# Patient Record
Sex: FEMALE | Race: BLACK OR AFRICAN AMERICAN | Employment: FULL TIME | URBAN - METROPOLITAN AREA
[De-identification: names, ages, dates, MRNs, and addresses within clinical notes are randomized per-mention and may not be internally consistent; named-entity substitution may affect disease eponyms.]

---

## 2017-10-29 ENCOUNTER — HOSPITAL ENCOUNTER (OUTPATIENT)
Dept: ULTRASOUND IMAGING | Age: 47
Discharge: HOME | End: 2017-10-29
Attending: FAMILY MEDICINE
Payer: COMMERCIAL

## 2017-10-29 DIAGNOSIS — N94.6 PAINFUL MENSTRUATION: ICD-10-CM

## 2017-10-29 DIAGNOSIS — N94.6 DYSMENORRHEA: ICD-10-CM

## 2017-10-29 DIAGNOSIS — N92.0 UNUSUALLY FREQUENT MENSES: ICD-10-CM

## 2017-10-29 DIAGNOSIS — N92.0 EXCESSIVE OR FREQUENT MENSTRUATION: ICD-10-CM

## 2017-10-29 PROCEDURE — 76856 US EXAM PELVIC COMPLETE: CPT

## 2017-10-29 PROCEDURE — 76830 TRANSVAGINAL US NON-OB: CPT

## 2018-02-02 ENCOUNTER — HOSPITAL ENCOUNTER (OUTPATIENT)
Dept: PREADMISSION TESTING | Age: 48
Discharge: HOME | End: 2018-02-02
Attending: OBSTETRICS & GYNECOLOGY
Payer: COMMERCIAL

## 2018-02-02 VITALS
HEIGHT: 70 IN | RESPIRATION RATE: 20 BRPM | OXYGEN SATURATION: 100 % | HEART RATE: 84 BPM | BODY MASS INDEX: 40.81 KG/M2 | SYSTOLIC BLOOD PRESSURE: 140 MMHG | WEIGHT: 285.06 LBS | DIASTOLIC BLOOD PRESSURE: 75 MMHG | TEMPERATURE: 98 F

## 2018-02-02 LAB
ABO + RH BLD: NORMAL
ANION GAP SERPL CALC-SCNC: 8 MMOL/L (ref 5–15)
APPEARANCE UR: CLEAR
ATRIAL RATE: 76 BPM
BACTERIA URNS QL MICRO: ABNORMAL /HPF
BILIRUB UR QL: NEGATIVE
BLOOD GROUP ANTIBODIES SERPL: NORMAL
BUN SERPL-MCNC: 10 MG/DL (ref 6–20)
BUN/CREAT SERPL: 14 (ref 12–20)
CALCIUM SERPL-MCNC: 9 MG/DL (ref 8.5–10.1)
CALCULATED P AXIS, ECG09: 48 DEGREES
CALCULATED R AXIS, ECG10: 12 DEGREES
CALCULATED T AXIS, ECG11: 48 DEGREES
CHLORIDE SERPL-SCNC: 106 MMOL/L (ref 97–108)
CO2 SERPL-SCNC: 27 MMOL/L (ref 21–32)
COLOR UR: ABNORMAL
CREAT SERPL-MCNC: 0.73 MG/DL (ref 0.55–1.02)
DIAGNOSIS, 93000: NORMAL
EPITH CASTS URNS QL MICRO: ABNORMAL /LPF
ERYTHROCYTE [DISTWIDTH] IN BLOOD BY AUTOMATED COUNT: 15.5 % (ref 11.5–14.5)
GLUCOSE SERPL-MCNC: 85 MG/DL (ref 65–100)
GLUCOSE UR STRIP.AUTO-MCNC: NEGATIVE MG/DL
HCT VFR BLD AUTO: 31.2 % (ref 35–47)
HGB BLD-MCNC: 9.3 G/DL (ref 11.5–16)
HGB UR QL STRIP: NEGATIVE
HYALINE CASTS URNS QL MICRO: ABNORMAL /LPF (ref 0–5)
KETONES UR QL STRIP.AUTO: NEGATIVE MG/DL
LEUKOCYTE ESTERASE UR QL STRIP.AUTO: NEGATIVE
MCH RBC QN AUTO: 21.1 PG (ref 26–34)
MCHC RBC AUTO-ENTMCNC: 29.8 G/DL (ref 30–36.5)
MCV RBC AUTO: 70.7 FL (ref 80–99)
NITRITE UR QL STRIP.AUTO: NEGATIVE
NRBC # BLD: 0 K/UL (ref 0–0.01)
NRBC BLD-RTO: 0 PER 100 WBC
P-R INTERVAL, ECG05: 146 MS
PH UR STRIP: 5.5 [PH] (ref 5–8)
PLATELET # BLD AUTO: 439 K/UL (ref 150–400)
PMV BLD AUTO: 9.7 FL (ref 8.9–12.9)
POTASSIUM SERPL-SCNC: 4 MMOL/L (ref 3.5–5.1)
PROT UR STRIP-MCNC: NEGATIVE MG/DL
Q-T INTERVAL, ECG07: 398 MS
QRS DURATION, ECG06: 84 MS
QTC CALCULATION (BEZET), ECG08: 447 MS
RBC # BLD AUTO: 4.41 M/UL (ref 3.8–5.2)
RBC #/AREA URNS HPF: ABNORMAL /HPF (ref 0–5)
SODIUM SERPL-SCNC: 141 MMOL/L (ref 136–145)
SP GR UR REFRACTOMETRY: 1.01 (ref 1–1.03)
SPECIMEN EXP DATE BLD: NORMAL
UA: UC IF INDICATED,UAUC: ABNORMAL
UROBILINOGEN UR QL STRIP.AUTO: 0.2 EU/DL (ref 0.2–1)
VENTRICULAR RATE, ECG03: 76 BPM
WBC # BLD AUTO: 8.9 K/UL (ref 3.6–11)
WBC URNS QL MICRO: ABNORMAL /HPF (ref 0–4)

## 2018-02-02 PROCEDURE — 85027 COMPLETE CBC AUTOMATED: CPT | Performed by: OBSTETRICS & GYNECOLOGY

## 2018-02-02 PROCEDURE — 81001 URINALYSIS AUTO W/SCOPE: CPT | Performed by: OBSTETRICS & GYNECOLOGY

## 2018-02-02 PROCEDURE — 86900 BLOOD TYPING SEROLOGIC ABO: CPT | Performed by: OBSTETRICS & GYNECOLOGY

## 2018-02-02 PROCEDURE — 36415 COLL VENOUS BLD VENIPUNCTURE: CPT | Performed by: OBSTETRICS & GYNECOLOGY

## 2018-02-02 PROCEDURE — 80048 BASIC METABOLIC PNL TOTAL CA: CPT | Performed by: OBSTETRICS & GYNECOLOGY

## 2018-02-02 PROCEDURE — 93005 ELECTROCARDIOGRAM TRACING: CPT

## 2018-02-02 PROCEDURE — 87086 URINE CULTURE/COLONY COUNT: CPT | Performed by: OBSTETRICS & GYNECOLOGY

## 2018-02-02 RX ORDER — LEVOTHYROXINE SODIUM 150 UG/1
150 TABLET ORAL
COMMUNITY

## 2018-02-02 RX ORDER — CEFAZOLIN SODIUM/WATER 2 G/20 ML
2 SYRINGE (ML) INTRAVENOUS ONCE
Status: CANCELLED | OUTPATIENT
Start: 2018-02-08 | End: 2018-02-08

## 2018-02-02 RX ORDER — BISMUTH SUBSALICYLATE 262 MG
1 TABLET,CHEWABLE ORAL DAILY
COMMUNITY

## 2018-02-02 RX ORDER — LANOLIN ALCOHOL/MO/W.PET/CERES
CREAM (GRAM) TOPICAL
COMMUNITY

## 2018-02-02 NOTE — PERIOP NOTES
Left message with Dr. Evans Grams office concerning pre-op antibiotic dose/weight--office to follow up.

## 2018-02-02 NOTE — PERIOP NOTES
Incentive Spirometer        Using the incentive spirometer helps expand the small air sacs of your lungs, helps you breathe deeply, and helps improve your lung function. Use your incentive spirometer twice a day (10 breaths each time) prior to surgery. How to Use Your Incentive Spirometer:  1. Hold the incentive spirometer in an upright position. 2. Breathe out as usual.   3. Place the mouthpiece in your mouth and seal your lips tightly around it. 4. Take a deep breath. Breathe in slowly and as deeply as possible. Keep the blue flow rate guide between the arrows. 5. Hold your breath as long as possible. Then exhale slowly and allow the piston to fall to the bottom of the column. 6. Rest for a few seconds and repeat steps one through five at least 10 times. PAT Tidal Volume_________2200_________  x_____4___________  Date__02/02/18_____________________    Cynthia Mehtat THE INCENTIVE SPIROMETER WITH YOU TO THE HOSPITAL ON THE DAY OF YOUR SURGERY. Opportunity given to ask and answer questions as well as to observe return demonstration.     Patient signature_____________________________    Witness____________________________

## 2018-02-02 NOTE — PERIOP NOTES
[facility]  Preoperative Instructions        Surgery Date 02/08/18         Time of Arrival 1000   Contact # [phone]    1. On the day of your surgery, please report to the Surgical Services Registration Desk and sign in at your designated time. The Surgery Center is located to the right of the Emergency Room. 2. You must have someone with you to drive you home. You should not drive a car for 24 hours following surgery. Please make arrangements for a friend or family member to stay with you for the first 24 hours after your surgery. 3. Do not have anything to eat or drink (including water, gum, mints, coffee, juice) after midnight 02/07/18   . ? This may not apply to medications prescribed by your physician. ?(Please note below the special instructions with medications to take the morning of your procedure.)    4. We recommend you do not drink any alcoholic beverages for 24 hours before and after your surgery. 5. Contact your surgeons office for instructions on the following medications: non-steroidal anti-inflammatory drugs (i.e. Advil, Aleve), vitamins, and supplements. (Some surgeons will want you to stop these medications prior to surgery and others may allow you to take them)  **If you are currently taking Plavix, Coumadin, Aspirin and/or other blood-thinning agents, contact your surgeon for instructions. ** Your surgeon will partner with the physician prescribing these medications to determine if it is safe to stop or if you need to continue taking. Please do not stop taking these medications without instructions from your surgeon    6. Wear comfortable clothes. Wear glasses instead of contacts. Do not bring any money or jewelry. Please bring picture ID, insurance card, and any prearranged co-payment or hospital payment. Do not wear make-up, particularly mascara the morning of your surgery. Do not wear nail polish, particularly if you are having foot /hand surgery. Wear your hair loose or down, no ponytails, buns, vasu pins or clips. All body piercings must be removed. Please shower with antibacterial soap for three consecutive days before and on the morning of surgery, but do not apply any lotions, powders or deodorants after the shower on the day of surgery. Please use a fresh towels after each shower. Please sleep in clean clothes and change bed linens the night before surgery. Please do not shave for 48 hours prior to surgery. Shaving of the face is acceptable. 7. You should understand that if you do not follow these instructions your surgery may be cancelled. If your physical condition changes (I.e. fever, cold or flu) please contact your surgeon as soon as possible. 8. It is important that you be on time. If a situation occurs where you may be late, please call [phone] (OR Holding Area). 9. If you have any questions and or problems, please call [phone] (Pre-admission Testing). 10. Your surgery time may be subject to change. You will receive a phone call the evening prior if your time changes. 11.  If having outpatient surgery, you must have someone to drive you here, stay with you during the duration of your stay, and to drive you home at time of discharge. 12.   In an effort to improve the efficiency, privacy, and safety for all of our Pre-op patients visitors are not allowed in the Holding area. Once you arrive and are registered your family/visitors will be asked to remain in the waiting room. The Pre-op staff will get you from the Surgical Waiting Area and will explain to you and your family/visitors that the Pre-op phase is beginning. The staff will answer any questions and provide instructions for tracking of the patient, by use of the existing tracking number and color-coded status board in the waiting room.   At this time the staff will also ask for your designated spokesperson information in the event that the physician or staff need to provide an update or obtain any pertinent information. The designated spokesperson will be notified if the physician needs to speak to family during the pre-operative phase. If at any time your family/visitors has questions or concerns they may approach the volunteer desk in the waiting area for assistance. Special Instructions:Please use antibacterial soap provided with pre-op appointment/please practice with incentive spirometry provided with pre-op appointment/please bring back to the hospital for use    [place] A SIP OF WATER:thyroid      I understand a pre-operative phone call will be made to verify my surgery time. In the event that I am not available, I give permission for a message to be left on my answering service and/or with another person?   yes        ___________________      __________   _________    (Signature of Patient)             (Witness)                (Date and Time)

## 2018-02-04 LAB
BACTERIA SPEC CULT: NORMAL
CC UR VC: NORMAL
SERVICE CMNT-IMP: NORMAL

## 2018-02-05 NOTE — H&P
Visit Type:  Pre-Op  Primary Provider:  Ayaka Granado MD    CC:  abnormal bleeding. History of Present Illness:  57 yr old patient presents today for pre op     Procedure Info   Procedure: robotic hysteretomy, bilateral salpingectomy  Diagnosis Primary: fibroids  Anticipated Time Out of Work: 6 weeks  Diagnosis Secondary: abnormal bleeding  Surg Date: 2018  Surgery Loc: [facility] Main OR  Start Time: 12:00 noon    US:  Uterus 9x6x7 cm. EMS 1.3 cm.  3 fibroids, largest is 4 cm.  normal ovaries. fibroids obscure EMS. Hgb 8--> 9.4. Going to [facility] today for repeat labs. Has hypothyroid on synthroid, well-controlled with PCP    Normal pap/EMBx. She has tried Provera and OCPs and is not satisfied with the effect. Vital Signs   57Years Old Female  Height:  70 inches  Weight: 284 pounds  BMI:      40.90  BSA:      2.42  BP:       120/82    Past Pregnancy History   : 0  Para:     0  Aborta:  0  Term: 0, Premature: 0, Living Children: 0, Vaginal Deliveries: 0, C-Sections: 0, Elect. Ab: 0, Ectopics: 0    Gynecologic History   Patient is unsure of LMP  Last Menstrual Period: 2018  Does patient have any problems with urine leakage? no  Does patient have any other bladder problems? no  History of abnormal pap: no  Gardasil Injection History: Not Applicable  Pt currently sexually active: no  Pt ever sexually active: yes  Current Contraception: None. History of STD: no   The patient opts not to have HIV testing today. Allergies    This patient has no known allergies.     Medications Removed from Medication List          Past Medical History:     Reviewed history from 2018 and no changes required:        Fibroids        hx of PID        Anemia    Past Surgical History:     Reviewed history from 2017 and no changes required:        Thyroid Surgery    Family History Summary:      Reviewed history Last on 2018 and no changes required:2018  Brother (full) - Has Family History of Heart Disease - Entered On: 11/1/2017  MGM - Has Family History of Colon Cancer - Entered On: 11/1/2017    General Comments - FH:  [place] history [place] [facility] [place]     Social History:     Reviewed history from 11/01/2017 and no changes required:        Single        Adopted son at 1 months old, now 25years old        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 General Medical Physical Exam:     General Appearance:       well developed, well nourished, in no acute distress    Head: Inspection:  normocephalic without obvious abnormalities    Eyes:        External:  EOM intact    Ears, Nose, Throat:        External:  normocephalic and atraumatic       Hearing:  grossly intact    Neck:        Neck:  supple; no masses; trachea midline       Thyroid:  no nodules, masses, tenderness, or enlargement    Respiratory:        Resp. effort:  no use of accessory muscles       Auscultation:   no rales, rhonchi, or wheezes    Cardiovascular: Auscultation:   normal S1 and  S2; no murmur, rub, or gallop       Peripheral circ: no cyanosis, clubbing, or edema    Gastrointestinal:        Abdomen:  soft and non-tender; no masses       Liver/spleen:   no enlargement or nodularity       Hernia:  no hernias    Genitourinary:        Uterus:  previously sounded to 10 cm.      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 Addressed 6 weeks no heavy lifting or intercourse. Reviewed consent including risk of bleeding, infection, damage to surrounding structures. Written consent signed. Would accept a transfusion. Reviewed possibility of x-lap if unable to complete laparoscopically. To precare after this appointment. Ok to take synthroid with sip of water day of surgery. Plan for 23 hr obs. Orders:  Pre-Op Exam (CPT-PO)      Other Orders:  Inpatient Admission - Medium (CPT-AdmitF)    Medications (at conclusion of this visit)    11/01/2017 DAILY MULTIVITAMIN ORAL CAPSULE (MULTIPLE VITAMINS-MINERALS) Prescribed by non 606/Urmila Steele MD  81/91/6410 FOLIC ACID CAPSULE (FOLIC ACID CAPS) Prescribed by ruslan Steele MD  11/01/2017 IRON SUPPLEMENT 325 (65 FE) MG ORAL TABLET (FERROUS SULFATE) Prescribed by ruslan Steele MD  11/01/2017 SYNTHROID 150 MCG ORAL TABLET (LEVOTHYROXINE SODIUM) Prescribed by ruslan Steele MD          Electronically signed by Joe Pineda MD on 02/02/2018 at 5:47 PM    Electronically signed by Garrett Horne MD on 02/03/2018 at 10:23 PM  ________________________________________________________________________    Date of Surgery Update:  Erika Levine was seen and examined. History and physical has been reviewed. The patient has been examined.  There have been no significant clinical changes since the completion of the originally dated History and Physical.    Signed By: Joe Pineda MD     February 8, 2018 11:51 AM

## 2018-02-08 ENCOUNTER — HOSPITAL ENCOUNTER (OUTPATIENT)
Age: 48
Setting detail: OBSERVATION
Discharge: HOME | End: 2018-02-09
Attending: OBSTETRICS & GYNECOLOGY | Admitting: OBSTETRICS & GYNECOLOGY
Payer: COMMERCIAL

## 2018-02-08 ENCOUNTER — ANESTHESIA (OUTPATIENT)
Dept: SURGERY | Age: 48
End: 2018-02-08
Payer: COMMERCIAL

## 2018-02-08 ENCOUNTER — ANESTHESIA EVENT (OUTPATIENT)
Dept: SURGERY | Age: 48
End: 2018-02-08
Payer: COMMERCIAL

## 2018-02-08 DIAGNOSIS — Z90.710 S/P HYSTERECTOMY: Primary | ICD-10-CM

## 2018-02-08 LAB — HCG UR QL: NEGATIVE

## 2018-02-08 PROCEDURE — 81025 URINE PREGNANCY TEST: CPT

## 2018-02-08 PROCEDURE — 77030010507 HC ADH SKN DERMBND J&J -B: Performed by: OBSTETRICS & GYNECOLOGY

## 2018-02-08 PROCEDURE — 76210000016 HC OR PH I REC 1 TO 1.5 HR: Performed by: OBSTETRICS & GYNECOLOGY

## 2018-02-08 PROCEDURE — 74011250637 HC RX REV CODE- 250/637: Performed by: OBSTETRICS & GYNECOLOGY

## 2018-02-08 PROCEDURE — 77030002933 HC SUT MCRYL J&J -A: Performed by: OBSTETRICS & GYNECOLOGY

## 2018-02-08 PROCEDURE — 77030032490 HC SLV COMPR SCD KNE COVD -B: Performed by: OBSTETRICS & GYNECOLOGY

## 2018-02-08 PROCEDURE — 77030034849: Performed by: OBSTETRICS & GYNECOLOGY

## 2018-02-08 PROCEDURE — 74011250636 HC RX REV CODE- 250/636: Performed by: ANESTHESIOLOGY

## 2018-02-08 PROCEDURE — 77030035029 HC NDL INSUF VERES DISP COVD -B: Performed by: OBSTETRICS & GYNECOLOGY

## 2018-02-08 PROCEDURE — 74011250636 HC RX REV CODE- 250/636

## 2018-02-08 PROCEDURE — 77030002895 HC DEV VASC CLOSR COVD -B: Performed by: OBSTETRICS & GYNECOLOGY

## 2018-02-08 PROCEDURE — 77030020782 HC GWN BAIR PAWS FLX 3M -B

## 2018-02-08 PROCEDURE — 74011250636 HC RX REV CODE- 250/636: Performed by: OBSTETRICS & GYNECOLOGY

## 2018-02-08 PROCEDURE — 77030011640 HC PAD GRND REM COVD -A: Performed by: OBSTETRICS & GYNECOLOGY

## 2018-02-08 PROCEDURE — P9045 ALBUMIN (HUMAN), 5%, 250 ML: HCPCS

## 2018-02-08 PROCEDURE — 77030027743 HC APPL F/HEMSTAT BARD -B: Performed by: OBSTETRICS & GYNECOLOGY

## 2018-02-08 PROCEDURE — 77030020263 HC SOL INJ SOD CL0.9% LFCR 1000ML: Performed by: OBSTETRICS & GYNECOLOGY

## 2018-02-08 PROCEDURE — 88307 TISSUE EXAM BY PATHOLOGIST: CPT | Performed by: OBSTETRICS & GYNECOLOGY

## 2018-02-08 PROCEDURE — 77030016151 HC PROTCTR LNS DFOG COVD -B: Performed by: OBSTETRICS & GYNECOLOGY

## 2018-02-08 PROCEDURE — 77030035277 HC OBTRTR BLDELSS DISP INTU -B: Performed by: OBSTETRICS & GYNECOLOGY

## 2018-02-08 PROCEDURE — 77030003666 HC NDL SPINAL BD -A: Performed by: OBSTETRICS & GYNECOLOGY

## 2018-02-08 PROCEDURE — 74011250637 HC RX REV CODE- 250/637

## 2018-02-08 PROCEDURE — 76010000878 HC OR TIME 3 TO 3.5HR INTENSV - TIER 2: Performed by: OBSTETRICS & GYNECOLOGY

## 2018-02-08 PROCEDURE — 77030018846 HC SOL IRR STRL H20 ICUM -A: Performed by: OBSTETRICS & GYNECOLOGY

## 2018-02-08 PROCEDURE — 76060000037 HC ANESTHESIA 3 TO 3.5 HR: Performed by: OBSTETRICS & GYNECOLOGY

## 2018-02-08 PROCEDURE — 77030031492 HC PRT ACC BLNT AIRSEAL CNMD -B: Performed by: OBSTETRICS & GYNECOLOGY

## 2018-02-08 PROCEDURE — 74011000250 HC RX REV CODE- 250

## 2018-02-08 PROCEDURE — 74011000250 HC RX REV CODE- 250: Performed by: OBSTETRICS & GYNECOLOGY

## 2018-02-08 PROCEDURE — 77030020703 HC SEAL CANN DISP INTU -B: Performed by: OBSTETRICS & GYNECOLOGY

## 2018-02-08 PROCEDURE — 77030008756 HC TU IRR SUC STRY -B: Performed by: OBSTETRICS & GYNECOLOGY

## 2018-02-08 PROCEDURE — 77030031139 HC SUT VCRL2 J&J -A: Performed by: OBSTETRICS & GYNECOLOGY

## 2018-02-08 PROCEDURE — 77030011256 HC DRSG MEPILEX <16IN NO BORD MOLN -A

## 2018-02-08 PROCEDURE — 77030002896 HC SUT CLP ABSRB J&J -B: Performed by: OBSTETRICS & GYNECOLOGY

## 2018-02-08 PROCEDURE — 99218 HC RM OBSERVATION: CPT

## 2018-02-08 RX ORDER — NALOXONE HYDROCHLORIDE 0.4 MG/ML
0.4 INJECTION, SOLUTION INTRAMUSCULAR; INTRAVENOUS; SUBCUTANEOUS AS NEEDED
Status: DISCONTINUED | OUTPATIENT
Start: 2018-02-08 | End: 2018-02-09 | Stop reason: HOSPADM

## 2018-02-08 RX ORDER — SODIUM CHLORIDE, SODIUM LACTATE, POTASSIUM CHLORIDE, CALCIUM CHLORIDE 600; 310; 30; 20 MG/100ML; MG/100ML; MG/100ML; MG/100ML
25 INJECTION, SOLUTION INTRAVENOUS CONTINUOUS
Status: DISCONTINUED | OUTPATIENT
Start: 2018-02-08 | End: 2018-02-08 | Stop reason: HOSPADM

## 2018-02-08 RX ORDER — ROCURONIUM BROMIDE 10 MG/ML
INJECTION, SOLUTION INTRAVENOUS AS NEEDED
Status: DISCONTINUED | OUTPATIENT
Start: 2018-02-08 | End: 2018-02-08 | Stop reason: HOSPADM

## 2018-02-08 RX ORDER — SODIUM CHLORIDE 0.9 % (FLUSH) 0.9 %
5-10 SYRINGE (ML) INJECTION AS NEEDED
Status: DISCONTINUED | OUTPATIENT
Start: 2018-02-08 | End: 2018-02-09 | Stop reason: HOSPADM

## 2018-02-08 RX ORDER — BUPIVACAINE HYDROCHLORIDE AND EPINEPHRINE 5; 5 MG/ML; UG/ML
INJECTION, SOLUTION EPIDURAL; INTRACAUDAL; PERINEURAL AS NEEDED
Status: DISCONTINUED | OUTPATIENT
Start: 2018-02-08 | End: 2018-02-08 | Stop reason: HOSPADM

## 2018-02-08 RX ORDER — FENTANYL CITRATE 50 UG/ML
INJECTION, SOLUTION INTRAMUSCULAR; INTRAVENOUS
Status: COMPLETED
Start: 2018-02-08 | End: 2018-02-08

## 2018-02-08 RX ORDER — MORPHINE SULFATE 10 MG/ML
2 INJECTION, SOLUTION INTRAMUSCULAR; INTRAVENOUS
Status: DISCONTINUED | OUTPATIENT
Start: 2018-02-08 | End: 2018-02-08 | Stop reason: HOSPADM

## 2018-02-08 RX ORDER — KETOROLAC TROMETHAMINE 30 MG/ML
30 INJECTION, SOLUTION INTRAMUSCULAR; INTRAVENOUS
Status: DISCONTINUED | OUTPATIENT
Start: 2018-02-08 | End: 2018-02-09 | Stop reason: HOSPADM

## 2018-02-08 RX ORDER — DIPHENHYDRAMINE HYDROCHLORIDE 50 MG/ML
12.5 INJECTION, SOLUTION INTRAMUSCULAR; INTRAVENOUS AS NEEDED
Status: DISCONTINUED | OUTPATIENT
Start: 2018-02-08 | End: 2018-02-08 | Stop reason: HOSPADM

## 2018-02-08 RX ORDER — DIPHENHYDRAMINE HYDROCHLORIDE 50 MG/ML
12.5 INJECTION, SOLUTION INTRAMUSCULAR; INTRAVENOUS
Status: DISCONTINUED | OUTPATIENT
Start: 2018-02-08 | End: 2018-02-09 | Stop reason: HOSPADM

## 2018-02-08 RX ORDER — ONDANSETRON 2 MG/ML
4 INJECTION INTRAMUSCULAR; INTRAVENOUS AS NEEDED
Status: DISCONTINUED | OUTPATIENT
Start: 2018-02-08 | End: 2018-02-08 | Stop reason: HOSPADM

## 2018-02-08 RX ORDER — FENTANYL CITRATE 50 UG/ML
25 INJECTION, SOLUTION INTRAMUSCULAR; INTRAVENOUS
Status: DISCONTINUED | OUTPATIENT
Start: 2018-02-08 | End: 2018-02-08 | Stop reason: HOSPADM

## 2018-02-08 RX ORDER — NEOSTIGMINE METHYLSULFATE 1 MG/ML
INJECTION INTRAVENOUS AS NEEDED
Status: DISCONTINUED | OUTPATIENT
Start: 2018-02-08 | End: 2018-02-08 | Stop reason: HOSPADM

## 2018-02-08 RX ORDER — DEXTROSE, SODIUM CHLORIDE, AND POTASSIUM CHLORIDE 5; .45; .075 G/100ML; G/100ML; G/100ML
100 INJECTION INTRAVENOUS CONTINUOUS
Status: DISCONTINUED | OUTPATIENT
Start: 2018-02-08 | End: 2018-02-09 | Stop reason: HOSPADM

## 2018-02-08 RX ORDER — SUCCINYLCHOLINE CHLORIDE 20 MG/ML
INJECTION INTRAMUSCULAR; INTRAVENOUS AS NEEDED
Status: DISCONTINUED | OUTPATIENT
Start: 2018-02-08 | End: 2018-02-08 | Stop reason: HOSPADM

## 2018-02-08 RX ORDER — MIDAZOLAM HYDROCHLORIDE 1 MG/ML
INJECTION, SOLUTION INTRAMUSCULAR; INTRAVENOUS AS NEEDED
Status: DISCONTINUED | OUTPATIENT
Start: 2018-02-08 | End: 2018-02-08 | Stop reason: HOSPADM

## 2018-02-08 RX ORDER — PROPOFOL 10 MG/ML
INJECTION, EMULSION INTRAVENOUS AS NEEDED
Status: DISCONTINUED | OUTPATIENT
Start: 2018-02-08 | End: 2018-02-08 | Stop reason: HOSPADM

## 2018-02-08 RX ORDER — GLYCOPYRROLATE 0.2 MG/ML
INJECTION INTRAMUSCULAR; INTRAVENOUS AS NEEDED
Status: DISCONTINUED | OUTPATIENT
Start: 2018-02-08 | End: 2018-02-08 | Stop reason: HOSPADM

## 2018-02-08 RX ORDER — SODIUM CHLORIDE 0.9 % (FLUSH) 0.9 %
5-10 SYRINGE (ML) INJECTION EVERY 8 HOURS
Status: DISCONTINUED | OUTPATIENT
Start: 2018-02-08 | End: 2018-02-09 | Stop reason: HOSPADM

## 2018-02-08 RX ORDER — OXYCODONE AND ACETAMINOPHEN 5; 325 MG/1; MG/1
1 TABLET ORAL
Status: DISCONTINUED | OUTPATIENT
Start: 2018-02-08 | End: 2018-02-09 | Stop reason: HOSPADM

## 2018-02-08 RX ORDER — DEXAMETHASONE SODIUM PHOSPHATE 4 MG/ML
INJECTION, SOLUTION INTRA-ARTICULAR; INTRALESIONAL; INTRAMUSCULAR; INTRAVENOUS; SOFT TISSUE AS NEEDED
Status: DISCONTINUED | OUTPATIENT
Start: 2018-02-08 | End: 2018-02-08 | Stop reason: HOSPADM

## 2018-02-08 RX ORDER — LIDOCAINE HYDROCHLORIDE 20 MG/ML
INJECTION, SOLUTION EPIDURAL; INFILTRATION; INTRACAUDAL; PERINEURAL AS NEEDED
Status: DISCONTINUED | OUTPATIENT
Start: 2018-02-08 | End: 2018-02-08 | Stop reason: HOSPADM

## 2018-02-08 RX ORDER — SODIUM CHLORIDE 0.9 % (FLUSH) 0.9 %
5-10 SYRINGE (ML) INJECTION AS NEEDED
Status: DISCONTINUED | OUTPATIENT
Start: 2018-02-08 | End: 2018-02-08 | Stop reason: HOSPADM

## 2018-02-08 RX ORDER — ONDANSETRON 4 MG/1
4 TABLET, ORALLY DISINTEGRATING ORAL
Status: DISCONTINUED | OUTPATIENT
Start: 2018-02-08 | End: 2018-02-09 | Stop reason: HOSPADM

## 2018-02-08 RX ORDER — ACETAMINOPHEN 10 MG/ML
INJECTION, SOLUTION INTRAVENOUS AS NEEDED
Status: DISCONTINUED | OUTPATIENT
Start: 2018-02-08 | End: 2018-02-08 | Stop reason: HOSPADM

## 2018-02-08 RX ORDER — ALBUMIN HUMAN 50 G/1000ML
SOLUTION INTRAVENOUS AS NEEDED
Status: DISCONTINUED | OUTPATIENT
Start: 2018-02-08 | End: 2018-02-08 | Stop reason: HOSPADM

## 2018-02-08 RX ORDER — ALBUTEROL SULFATE 90 UG/1
AEROSOL, METERED RESPIRATORY (INHALATION) AS NEEDED
Status: DISCONTINUED | OUTPATIENT
Start: 2018-02-08 | End: 2018-02-08 | Stop reason: HOSPADM

## 2018-02-08 RX ORDER — HYDROMORPHONE HYDROCHLORIDE 1 MG/ML
.2-.5 INJECTION, SOLUTION INTRAMUSCULAR; INTRAVENOUS; SUBCUTANEOUS
Status: DISCONTINUED | OUTPATIENT
Start: 2018-02-08 | End: 2018-02-08 | Stop reason: HOSPADM

## 2018-02-08 RX ORDER — SODIUM CHLORIDE, SODIUM LACTATE, POTASSIUM CHLORIDE, CALCIUM CHLORIDE 600; 310; 30; 20 MG/100ML; MG/100ML; MG/100ML; MG/100ML
25 INJECTION, SOLUTION INTRAVENOUS CONTINUOUS
Status: DISCONTINUED | OUTPATIENT
Start: 2018-02-08 | End: 2018-02-08

## 2018-02-08 RX ORDER — FENTANYL CITRATE 50 UG/ML
INJECTION, SOLUTION INTRAMUSCULAR; INTRAVENOUS AS NEEDED
Status: DISCONTINUED | OUTPATIENT
Start: 2018-02-08 | End: 2018-02-08 | Stop reason: HOSPADM

## 2018-02-08 RX ORDER — MORPHINE SULFATE 10 MG/ML
4 INJECTION, SOLUTION INTRAMUSCULAR; INTRAVENOUS
Status: DISCONTINUED | OUTPATIENT
Start: 2018-02-08 | End: 2018-02-09 | Stop reason: HOSPADM

## 2018-02-08 RX ORDER — DEXTROSE, SODIUM CHLORIDE, AND POTASSIUM CHLORIDE 5; .45; .075 G/100ML; G/100ML; G/100ML
INJECTION INTRAVENOUS
Status: COMPLETED
Start: 2018-02-08 | End: 2018-02-08

## 2018-02-08 RX ORDER — DOCUSATE SODIUM 100 MG/1
100 CAPSULE, LIQUID FILLED ORAL 2 TIMES DAILY
Status: DISCONTINUED | OUTPATIENT
Start: 2018-02-08 | End: 2018-02-09 | Stop reason: HOSPADM

## 2018-02-08 RX ORDER — HYDROMORPHONE HYDROCHLORIDE 2 MG/ML
INJECTION, SOLUTION INTRAMUSCULAR; INTRAVENOUS; SUBCUTANEOUS AS NEEDED
Status: DISCONTINUED | OUTPATIENT
Start: 2018-02-08 | End: 2018-02-08 | Stop reason: HOSPADM

## 2018-02-08 RX ADMIN — DEXTROSE MONOHYDRATE, SODIUM CHLORIDE, AND POTASSIUM CHLORIDE 100 ML/HR: 50; 4.5; .745 INJECTION, SOLUTION INTRAVENOUS at 16:04

## 2018-02-08 RX ADMIN — MORPHINE SULFATE 4 MG: 10 INJECTION, SOLUTION INTRAVENOUS at 21:38

## 2018-02-08 RX ADMIN — LIDOCAINE HYDROCHLORIDE 100 MG: 20 INJECTION, SOLUTION EPIDURAL; INFILTRATION; INTRACAUDAL; PERINEURAL at 12:18

## 2018-02-08 RX ADMIN — ALBUTEROL SULFATE 6 PUFF: 90 AEROSOL, METERED RESPIRATORY (INHALATION) at 14:41

## 2018-02-08 RX ADMIN — FENTANYL CITRATE 25 MCG: 50 INJECTION, SOLUTION INTRAMUSCULAR; INTRAVENOUS at 15:45

## 2018-02-08 RX ADMIN — DEXAMETHASONE SODIUM PHOSPHATE 4 MG: 4 INJECTION, SOLUTION INTRA-ARTICULAR; INTRALESIONAL; INTRAMUSCULAR; INTRAVENOUS; SOFT TISSUE at 14:33

## 2018-02-08 RX ADMIN — ACETAMINOPHEN 1000 MG: 10 INJECTION, SOLUTION INTRAVENOUS at 13:55

## 2018-02-08 RX ADMIN — FENTANYL CITRATE 25 MCG: 50 INJECTION, SOLUTION INTRAMUSCULAR; INTRAVENOUS at 16:18

## 2018-02-08 RX ADMIN — DOCUSATE SODIUM 100 MG: 100 CAPSULE, LIQUID FILLED ORAL at 18:53

## 2018-02-08 RX ADMIN — FENTANYL CITRATE 25 MCG: 50 INJECTION, SOLUTION INTRAMUSCULAR; INTRAVENOUS at 15:49

## 2018-02-08 RX ADMIN — PROPOFOL 150 MG: 10 INJECTION, EMULSION INTRAVENOUS at 12:18

## 2018-02-08 RX ADMIN — KETOROLAC TROMETHAMINE 30 MG: 30 INJECTION, SOLUTION INTRAMUSCULAR at 18:53

## 2018-02-08 RX ADMIN — PROPOFOL 50 MG: 10 INJECTION, EMULSION INTRAVENOUS at 14:23

## 2018-02-08 RX ADMIN — FENTANYL CITRATE 25 MCG: 50 INJECTION, SOLUTION INTRAMUSCULAR; INTRAVENOUS at 15:58

## 2018-02-08 RX ADMIN — FENTANYL CITRATE 100 MCG: 50 INJECTION, SOLUTION INTRAMUSCULAR; INTRAVENOUS at 12:18

## 2018-02-08 RX ADMIN — ROCURONIUM BROMIDE 20 MG: 10 INJECTION, SOLUTION INTRAVENOUS at 13:10

## 2018-02-08 RX ADMIN — Medication 10 ML: at 18:55

## 2018-02-08 RX ADMIN — SODIUM CHLORIDE, SODIUM LACTATE, POTASSIUM CHLORIDE, AND CALCIUM CHLORIDE 25 ML/HR: 600; 310; 30; 20 INJECTION, SOLUTION INTRAVENOUS at 10:53

## 2018-02-08 RX ADMIN — GLYCOPYRROLATE 0.4 MG: 0.2 INJECTION INTRAMUSCULAR; INTRAVENOUS at 14:58

## 2018-02-08 RX ADMIN — ROCURONIUM BROMIDE 20 MG: 10 INJECTION, SOLUTION INTRAVENOUS at 13:30

## 2018-02-08 RX ADMIN — ROCURONIUM BROMIDE 10 MG: 10 INJECTION, SOLUTION INTRAVENOUS at 14:31

## 2018-02-08 RX ADMIN — CEFAZOLIN 3 G: 1 INJECTION, POWDER, FOR SOLUTION INTRAMUSCULAR; INTRAVENOUS; PARENTERAL at 12:35

## 2018-02-08 RX ADMIN — ROCURONIUM BROMIDE 50 MG: 10 INJECTION, SOLUTION INTRAVENOUS at 12:27

## 2018-02-08 RX ADMIN — HYDROMORPHONE HYDROCHLORIDE 0.4 MG: 2 INJECTION, SOLUTION INTRAMUSCULAR; INTRAVENOUS; SUBCUTANEOUS at 14:00

## 2018-02-08 RX ADMIN — ALBUMIN HUMAN 250 ML: 50 SOLUTION INTRAVENOUS at 12:55

## 2018-02-08 RX ADMIN — SUCCINYLCHOLINE CHLORIDE 120 MG: 20 INJECTION INTRAMUSCULAR; INTRAVENOUS at 12:18

## 2018-02-08 RX ADMIN — MIDAZOLAM HYDROCHLORIDE 2 MG: 1 INJECTION, SOLUTION INTRAMUSCULAR; INTRAVENOUS at 12:12

## 2018-02-08 RX ADMIN — HYDROMORPHONE HYDROCHLORIDE 0.4 MG: 2 INJECTION, SOLUTION INTRAMUSCULAR; INTRAVENOUS; SUBCUTANEOUS at 12:36

## 2018-02-08 RX ADMIN — NEOSTIGMINE METHYLSULFATE 3 MG: 1 INJECTION INTRAVENOUS at 14:58

## 2018-02-08 NOTE — ANESTHESIA PREPROCEDURE EVALUATION
Anesthetic History   No history of anesthetic complications            Review of Systems / Medical History  Patient summary reviewed, nursing notes reviewed and pertinent labs reviewed    Pulmonary  Within defined limits                 Neuro/Psych   Within defined limits           Cardiovascular  Within defined limits                Exercise tolerance: >4 METS     GI/Hepatic/Renal  Within defined limits              Endo/Other        Morbid obesity     Other Findings            Physical Exam    Airway  Mallampati: III  TM Distance: 4 - 6 cm  Neck ROM: short neck   Mouth opening: Normal     Cardiovascular  Regular rate and rhythm,  S1 and S2 normal,  no murmur, click, rub, or gallop             Dental  No notable dental hx       Pulmonary  Breath sounds clear to auscultation               Abdominal  GI exam deferred       Other Findings            Anesthetic Plan    ASA: 2  Anesthesia type: general          Induction: Intravenous  Anesthetic plan and risks discussed with: Patient

## 2018-02-08 NOTE — IP AVS SNAPSHOT
[facility] [address] [facility] 
[phone] Patient: Eloy Vora MRN: [id number] RLP:[id number]/21/[id number] About your hospitalization You were admitted on:  February 8, 2018 You last received care in the:  \[facility]\"" 2 GENERAL SURGERY You were discharged on:  February 9, 2018 Why you were hospitalized Your primary diagnosis was:  Not on File Your diagnoses also included:  S/P Hysterectomy Follow-up Information Follow up With Details Comments Contact Info MD Amber Perez [address] [facility] 
[phone] Discharge Orders None A check avni indicates which time of day the medication should be taken. My Medications START taking these medications Instructions Each Dose to Equal  
 Morning Noon Evening Bedtime  
 acetaminophen 500 mg tablet Commonly known as:  80 Jr Lynette Ulrich  Your last dose was: Your next dose is: Take 1 Tab by mouth every six (6) hours as needed for Pain. 500 mg  
    
   
   
   
  
 docusate sodium 100 mg capsule Commonly known as:  Montey Roch Your last dose was: Your next dose is: Take 1 Cap by mouth two (2) times daily as needed for Constipation for up to 30 doses. 100 mg  
    
   
   
   
  
 ibuprofen 600 mg tablet Commonly known as:  MOTRIN Your last dose was: Your next dose is: Take 1 Tab by mouth every six (6) hours as needed for Pain. 600 mg  
    
   
   
   
  
 traMADol 50 mg tablet Commonly known as:  ULTRAM  
   
Your last dose was: Your next dose is: Take 1 Tab by mouth every six (6) hours as needed for Pain. Max Daily Amount: 200 mg.  
 50 mg CONTINUE taking these medications Instructions Each Dose to Equal  
 Morning Noon Evening Bedtime  FOLIC ACID PO  
   
 Your last dose was: Your next dose is: Take 1 mg by mouth daily. 1 mg Iron 325 mg (65 mg iron) tablet Generic drug:  ferrous sulfate Your last dose was: Your next dose is: Take  by mouth Daily (before breakfast). multivitamin tablet Commonly known as:  ONE A DAY Your last dose was: Your next dose is: Take 1 Tab by mouth daily. 1 Tab SYNTHROID 150 mcg tablet Generic drug:  levothyroxine Your last dose was: Your next dose is: Take 150 mcg by mouth Daily (before breakfast). 150 mcg STOP taking these medications ADVIL PM PO Where to Get Your Medications Information on where to get these meds will be given to you by the nurse or doctor. ! Ask your nurse or doctor about these medications  
  acetaminophen 500 mg tablet  
 docusate sodium 100 mg capsule  
 ibuprofen 600 mg tablet  
 traMADol 50 mg tablet Discharge Instructions None Introducing [facility] SERVICES! Dear Merlin Pane: 
Thank you for requesting a [facility] account. Our records indicate that you already have an active [facility] account. You can access your account anytime at [email or web address] Did you know that you can access your hospital and ER discharge instructions at any time in [facility]? You can also review all of your test results from your hospital stay or ER visit. Additional Information If you have questions, please visit the Frequently Asked Questions section of the [facility] website at [email or web address] Remember, [facility] is NOT to be used for urgent needs. For medical emergencies, dial 911. Now available from your iPhone and Android! Providers Seen During Your Hospitalization Provider Specialty Primary office phone Ayaka Granado MD Obstetrics & Gynecology [phone] Immunizations Administered for This Admission Name Date Influenza Vaccine (Quad) PF  Deferred () Your Primary Care Physician (PCP) Primary Care Physician Office Phone Office Fax Hannah Jacome [phone] You are allergic to the following Allergen Reactions Other Food Hives Itching Peach flavored candy (can eat peaches) [place]ool[place] (Aspirin-Sod Bicarb-Citric Acid) Hives Other Plant, Animal, Environmental Hives Swelling  
 gasoline Recent Documentation Height Weight BMI OB Status Smoking Status 1.778 m 129.8 kg 41.06 kg/m2 Having regular periods Never Smoker Emergency Contacts Name Discharge Info Relation Home Work Mobile Brandon Tomas CAREGIVER [3] Friend [5] [phone] Patient Belongings The following personal items are in your possession at time of discharge: 
  Dental Appliances: None  Visual Aid: None   Hearing Aids/Status: Does not own  Home Medications: None   Jewelry: None  Clothing: Undergarments, With patient, Footwear, Jacket/Coat (home clothing)    Other Valuables: None  Personal Items Sent to Safe: No valuables to send to security Please provide this summary of care documentation to your next provider. Signatures-by signing, you are acknowledging that this After Visit Summary has been reviewed with you and you have received a copy. Patient Signature:  ____________________________________________________________ Date:  ____________________________________________________________  
  
Becky Smith Provider Signature:  ____________________________________________________________ Date:  ____________________________________________________________

## 2018-02-08 NOTE — ANESTHESIA POSTPROCEDURE EVALUATION
Post-Anesthesia Evaluation and Assessment    Patient: Luis Drew MRN: [id number]  SSN: [id number]    YOB: 1970  Age: 57 y.o. Sex: female       Cardiovascular Function/Vital Signs  Visit Vitals    /69    Pulse (!) 58    Temp 36.8 °C (98.3 °F)    Resp 15    Ht 5' 10\" (1.778 m)    Wt 129.8 kg (286 lb 2.5 oz)    SpO2 100%    BMI 41.06 kg/m2       Patient is status post general anesthesia for Procedure(s):  ROBOTIC HYSTERECTOMY, BILATERAL SALPINGECTOMY. Nausea/Vomiting: None    Postoperative hydration reviewed and adequate. Pain:  Pain Scale 1: FLACC (02/08/18 1625)  Pain Intensity 1: 0 (02/08/18 1625)   Managed    Neurological Status:   Neuro (WDL): Exceptions to WDL (02/08/18 1532)  Neuro  Neurologic State: Drowsy (02/08/18 1532)  Orientation Level: Oriented X4 (02/08/18 1532)  Cognition: Follows commands (02/08/18 1532)  Speech: Clear (02/08/18 1532)  LUE Motor Response: Purposeful (02/08/18 1532)  LLE Motor Response: Purposeful (02/08/18 1532)  RUE Motor Response: Purposeful (02/08/18 1532)  RLE Motor Response: Purposeful (02/08/18 1532)   At baseline    Mental Status and Level of Consciousness: Arousable    Pulmonary Status:   O2 Device: Nasal cannula (02/08/18 1615)   Adequate oxygenation and airway patent    Complications related to anesthesia: None    Post-anesthesia assessment completed.  No concerns    Signed By: Sylvester Stringer MD     February 8, 2018

## 2018-02-08 NOTE — PERIOP NOTES
1532-Handoff Report from Operating Room to PACU    Report received from Zahra Morse and ADELFO Lee CRNA regarding [facility]. Surgeon(s):  MD Janet Stokes MD  And Procedure(s) (LRB):  ROBOTIC HYSTERECTOMY, BILATERAL SALPINGECTOMY (N/A)  confirmed   with allergies, drains and dressings discussed. Anesthesia type, drugs, patient history, complications, estimated blood loss, vital signs, intake and output, and last pain medication, lines, reversal medications and temperature were reviewed. 1620- No family in waiting area at this time. Update left with volunteer. 1700- Family updated. 1710-TRANSFER - OUT REPORT:    Verbal report given to Via Kayleigh Loaiza RN(name) on [facility]  being transferred to gen surg(unit) for routine post - op       Report consisted of patients Situation, Background, Assessment and   Recommendations(SBAR). Information from the following report(s) SBAR, Kardex, OR Summary, Procedure Summary, Intake/Output, MAR and Recent Results was reviewed with the receiving nurse. Opportunity for questions and clarification was provided.       Patient transported with:   O2 @ 2 liters  Tech

## 2018-02-08 NOTE — OP NOTES
Gynecologic Laparoscopy Procedure Note    Patient ID:     Name: Tamir Chatterjee    Medical Record Number: [id number]    YOB: 1970 February 8, 2018      Preoperative Diagnosis:   FIBROIDS, ABNORMAL BLEEDING    Postoperative Diagnosis: FIBROIDS, ABNORMAL BLEEDING    Surgeon: Liv Orozco MD    Assistant: Theresa Lin MD    Anesthesia: General    Procedure: Total robotic hysterectomy, bilateral salpingectomy    Estimated Blood Loss: 50 mL    Pathology /Specimens: uterus, cervix, bilateral fallopian tubes    Complications: None    Findings:   1. Normal upper abdominal survey  2. Pelvic survey with enlarged uterus to 12-14 weeks with multiple fibroids  3. Normal bilateral fallopian tubes and ovaries with minimal scar tissue consistent with prior PID. No other pelvic adhesions. Signed By: Liv Orozco MD      Procedure in Detail:  The patient was taken to the operating room where she was placed in the supine position. After undergoing adequate general endotracheal anesthesia, she was placed up in the [facility] laparoscopy stirrups in the dorsal lithotomy position. The patient was then prepped and draped in the usual fashion and de león catheter was placed into the bladder. Attention was first turned to the vagina where the speculum was placed in the vagina. The anterior lip of the cervix was grasped with a single-toothed tenaculum. The uterus was sounded to 10 cm. A large size V-care manipulator was inserted through the cervix. Tenaculum and speculum were removed and the V-care was positioned. Attention was then turned to the abdomen. An incision was made in the left upper quadrante and the abdomen was entered under direct optical visualization with a 5 mm port. C02 pnuemo-peritoneum was created without difficulty. There was no evidence of bowel injury nor bleeding. 4 robotic ports were then placed in a straight line in the upper abdomen.   The  Da Arelis robot system was docked and the robotic camera was introduced. Targeting was performed. The other 3 robotic arms were docked. The monopolar scissors, fenestrated bipolar, and John instruments were introduced. The assistant port was exchanged for a 10-12 mm port. The right round ligament was grasped and divided with cautery. The fallopian tube was cauterized from the mesosalpinx. The utero-ovarian ligament was isolated, cauterized, and divided. The remainder of the posterior broad ligament was dissected. The anterior leaf was then dissected and a bladder flap was created. The right uterine artery was cauterized and transected. A c-shaped incision was made around the pedicle. This procedure was repeated on the left side of the uterus. Colpotomy was made with cautery circumferentially until the uterus/cervix was free from the vaginal cuff. The uterus was delivered vaginally. The cuff was closed with 0-Vicryl in a running fashion. The pelvis was irrigated and hemostasis was noted at all pedicle sites and the vaginal cuff. All instruments were removed, robot was undocked, ports were removed, and CO2 gas was diffused. The left upper quadrant fascia was closed with 0 vicryl. Skin incisions were closed with 4-0 Monocryl in a subcuticular fashion and dermabond. The patient was awakened, extubated and taken to the recovery room in good condition.

## 2018-02-09 VITALS
HEIGHT: 70 IN | RESPIRATION RATE: 18 BRPM | BODY MASS INDEX: 40.97 KG/M2 | TEMPERATURE: 97.6 F | WEIGHT: 286.16 LBS | SYSTOLIC BLOOD PRESSURE: 115 MMHG | OXYGEN SATURATION: 100 % | DIASTOLIC BLOOD PRESSURE: 67 MMHG | HEART RATE: 67 BPM

## 2018-02-09 LAB
ERYTHROCYTE [DISTWIDTH] IN BLOOD BY AUTOMATED COUNT: 15.3 % (ref 11.5–14.5)
HCT VFR BLD AUTO: 28.7 % (ref 35–47)
HGB BLD-MCNC: 8.6 G/DL (ref 11.5–16)
MCH RBC QN AUTO: 20.8 PG (ref 26–34)
MCHC RBC AUTO-ENTMCNC: 30 G/DL (ref 30–36.5)
MCV RBC AUTO: 69.5 FL (ref 80–99)
NRBC # BLD: 0 K/UL (ref 0–0.01)
NRBC BLD-RTO: 0 PER 100 WBC
PLATELET # BLD AUTO: 330 K/UL (ref 150–400)
PMV BLD AUTO: 9.7 FL (ref 8.9–12.9)
RBC # BLD AUTO: 4.13 M/UL (ref 3.8–5.2)
WBC # BLD AUTO: 12.8 K/UL (ref 3.6–11)

## 2018-02-09 PROCEDURE — 74011250636 HC RX REV CODE- 250/636: Performed by: OBSTETRICS & GYNECOLOGY

## 2018-02-09 PROCEDURE — 74011250637 HC RX REV CODE- 250/637: Performed by: OBSTETRICS & GYNECOLOGY

## 2018-02-09 PROCEDURE — 77010033678 HC OXYGEN DAILY

## 2018-02-09 PROCEDURE — 36415 COLL VENOUS BLD VENIPUNCTURE: CPT | Performed by: OBSTETRICS & GYNECOLOGY

## 2018-02-09 PROCEDURE — 99218 HC RM OBSERVATION: CPT

## 2018-02-09 PROCEDURE — 85027 COMPLETE CBC AUTOMATED: CPT | Performed by: OBSTETRICS & GYNECOLOGY

## 2018-02-09 RX ORDER — DOCUSATE SODIUM 100 MG/1
100 CAPSULE, LIQUID FILLED ORAL
Qty: 60 CAP | Refills: 1 | Status: SHIPPED | OUTPATIENT
Start: 2018-02-09

## 2018-02-09 RX ORDER — TRAMADOL HYDROCHLORIDE 50 MG/1
50 TABLET ORAL
Qty: 30 TAB | Refills: 0 | Status: SHIPPED | OUTPATIENT
Start: 2018-02-09

## 2018-02-09 RX ORDER — IBUPROFEN 600 MG/1
600 TABLET ORAL
Qty: 30 TAB | Refills: 1 | Status: SHIPPED | OUTPATIENT
Start: 2018-02-09

## 2018-02-09 RX ORDER — ACETAMINOPHEN 500 MG
500 TABLET ORAL
Qty: 30 TAB | Refills: 1 | Status: SHIPPED | OUTPATIENT
Start: 2018-02-09

## 2018-02-09 RX ADMIN — KETOROLAC TROMETHAMINE 30 MG: 30 INJECTION, SOLUTION INTRAMUSCULAR at 06:29

## 2018-02-09 RX ADMIN — DEXTROSE MONOHYDRATE, SODIUM CHLORIDE, AND POTASSIUM CHLORIDE 100 ML/HR: 50; 4.5; .745 INJECTION, SOLUTION INTRAVENOUS at 01:37

## 2018-02-09 RX ADMIN — KETOROLAC TROMETHAMINE 30 MG: 30 INJECTION, SOLUTION INTRAMUSCULAR at 01:37

## 2018-02-09 RX ADMIN — DOCUSATE SODIUM 100 MG: 100 CAPSULE, LIQUID FILLED ORAL at 09:15

## 2018-02-09 NOTE — DISCHARGE SUMMARY
Gynecology Discharge Summary     Patient ID:  Jori Mtz  [id number]  76 y.o.  1970    Admit date: 2/8/2018    Discharge date: 2/9/2018     Admission Diagnoses:   Patient Active Problem List   Diagnosis Code    S/P hysterectomy Z90.710       Discharge Diagnoses: No discharge information exists for this patient. Patient Active Problem List   Diagnosis Code    S/P hysterectomy Z90.710       Procedures for this admission: Procedure(s):  ROBOTIC HYSTERECTOMY, BILATERAL SALPINGECTOMY    Hospital Course: Patient was admitted for observation after TRH/BS. She met post-op goals by POD#1 and was discharged to home. Disposition: home    Discharged Condition: good            Patient Instructions:   Current Discharge Medication List      START taking these medications    Details   ibuprofen (MOTRIN) 600 mg tablet Take 1 Tab by mouth every six (6) hours as needed for Pain. Qty: 30 Tab, Refills: 1    Associated Diagnoses: S/P hysterectomy      acetaminophen (TYLENOL EXTRA STRENGTH) 500 mg tablet Take 1 Tab by mouth every six (6) hours as needed for Pain. Qty: 30 Tab, Refills: 1    Associated Diagnoses: S/P hysterectomy      traMADol (ULTRAM) 50 mg tablet Take 1 Tab by mouth every six (6) hours as needed for Pain. Max Daily Amount: 200 mg. Qty: 30 Tab, Refills: 0    Associated Diagnoses: S/P hysterectomy      docusate sodium (COLACE) 100 mg capsule Take 1 Cap by mouth two (2) times daily as needed for Constipation for up to 30 doses. Qty: 60 Cap, Refills: 1    Associated Diagnoses: S/P hysterectomy         CONTINUE these medications which have NOT CHANGED    Details   FOLIC ACID PO Take 1 mg by mouth daily. ferrous sulfate (IRON) 325 mg (65 mg iron) tablet Take  by mouth Daily (before breakfast). levothyroxine (SYNTHROID) 150 mcg tablet Take 150 mcg by mouth Daily (before breakfast). multivitamin (ONE A DAY) tablet Take 1 Tab by mouth daily.          STOP taking these medications IBUPROFEN/DIPHENHYDRAMINE CIT (ADVIL PM PO) Comments:   Reason for Stopping:             Activity: No sex for 6 weeks and No heavy lifting for 6 weeks  Diet: Regular Diet  Wound Care: Keep wound clean and dry    Follow-up with Dr. Leila Putnam in 6 weeks.     Signed:  Ayaka Granado MD  2/9/2018  11:01 AM

## 2018-02-09 NOTE — PROGRESS NOTES
Gynecology Post Operative Note    Jamin Foy    Assessment: Doing well post-op. Hgb 9.1--> 8.6. Plan: Continue routine post-op care  Advance diet  Oral pain medications  Ambulate  Discharge home today with: Medications: ibuprofen, tylenol, tramadol, colace, and medications prior to admission (MVI, po iron, folic acid, synthroid). Follow up: 6 weeks Diet: as tolerated Activity: no heavy lifting and pelvic rest    Post-op day 1 from Procedure(s):  ROBOTIC HYSTERECTOMY, BILATERAL SALPINGECTOMY  She is without significant complaints. Pain controlled on current medication. Voiding without difficulty. Patient is passing flatus. She is is tolerating her diet. Orders/Charges: Low    Vitals:  Visit Vitals    /67    Pulse 67    Temp 97.6 °F (36.4 °C)    Resp 18    Ht 5' 10\" (1.778 m)    Wt 129.8 kg (286 lb 2.5 oz)    LMP 2018    SpO2 100%    BMI 41.06 kg/m2     Temp (24hrs), Av.1 °F (36.7 °C), Min:97.6 °F (36.4 °C), Max:98.5 °F (36.9 °C)      Last 24hr Input/Output:    Intake/Output Summary (Last 24 hours) at 18 1020  Last data filed at 18 0540   Gross per 24 hour   Intake             1000 ml   Output             2650 ml   Net            -1650 ml          Exam:  Patient without distress. Lungs clear              Abdomen soft, bowel sounds present, expected tenderness. Incisions dry and clean without erythema, dermabond. Lower extremities are negative for swelling, cords, or tenderness.     Labs: Lab Results   Component Value Date/Time    WBC 12.8 (H) 2018 01:46 AM    WBC 8.9 2018 10:26 AM    HGB 8.6 (L) 2018 01:46 AM    HGB 9.3 (L) 2018 10:26 AM    HCT 28.7 (L) 2018 01:46 AM    HCT 31.2 (L) 2018 10:26 AM    PLATELET 747  01:46 AM    PLATELET 749 (H)  10:26 AM       Recent Results (from the past 24 hour(s))   HCG URINE, QL. - POC    Collection Time: 18 10:54 AM   Result Value Ref Range Pregnancy test,urine (POC) NEGATIVE  NEG     CBC W/O DIFF    Collection Time: 02/09/18  1:46 AM   Result Value Ref Range    WBC 12.8 (H) 3.6 - 11.0 K/uL    RBC 4.13 3.80 - 5.20 M/uL    HGB 8.6 (L) 11.5 - 16.0 g/dL    HCT 28.7 (L) 35.0 - 47.0 %    MCV 69.5 (L) 80.0 - 99.0 FL    MCH 20.8 (L) 26.0 - 34.0 PG    MCHC 30.0 30.0 - 36.5 g/dL    RDW 15.3 (H) 11.5 - 14.5 %    PLATELET 204 385 - 970 K/uL    MPV 9.7 8.9 - 12.9 FL    NRBC 0.0 0  WBC    ABSOLUTE NRBC 0.00 0.00 - 0.01 K/uL

## 2018-02-09 NOTE — ROUTINE PROCESS
General Surgery End of Shift Nursing Note    Bedside shift change report given to Saida Whitmore (oncoming nurse) by Kesha George (offgoing nurse). Report included the following information SBAR, Kardex, OR Summary, Procedure Summary, Intake/Output, MAR and Recent Results. Shift worked:   [facility]   Summary of shift:    Pain well controlled. Del Toro removed at 0545.     Issues for physician to address:   none     Number times ambulated in hallway past shift: 0    Number of times OOB to chair past shift: 0    Pain Management:  Current medication: toradol   Patient states pain is manageable on current pain medication: YES    GI:    Current diet:  DIET CLEAR LIQUID    Tolerating current diet: YES  Passing flatus: YES  Last Bowel Movement: yesterday   Appearance:     Respiratory:    Incentive Spirometer at bedside: NO  Patient instructed on use: YES    Patient Safety:    Falls Score: [id number] Sathish Siu

## 2021-10-05 ENCOUNTER — TRANSCRIBE ORDER (OUTPATIENT)
Dept: REGISTRATION | Age: 51
End: 2021-10-05

## 2021-10-05 ENCOUNTER — HOSPITAL ENCOUNTER (OUTPATIENT)
Dept: GENERAL RADIOLOGY | Age: 51
Discharge: HOME | End: 2021-10-05
Payer: COMMERCIAL

## 2021-10-05 DIAGNOSIS — M54.31 RIGHT SIDED SCIATICA: ICD-10-CM

## 2021-10-05 DIAGNOSIS — M54.31 RIGHT SIDED SCIATICA: Primary | ICD-10-CM

## 2021-10-05 PROCEDURE — 72100 X-RAY EXAM L-S SPINE 2/3 VWS: CPT

## (undated) DEVICE — SOLUTION IRRIG 1000ML H2O STRL BLT

## (undated) DEVICE — 40580 - THE PINK PAD - ADVANCED TRENDELENBURG POSITIONING KIT: Brand: 40580 - THE PINK PAD - ADVANCED TRENDELENBURG POSITIONING KIT

## (undated) DEVICE — SEAL UNIV 5-8MM DISP BX/10 -- DA VINCI XI - SNGL USE

## (undated) DEVICE — SUTURE VCRL SZ 0 L36IN ABSRB VLT L36MM CT-1 1/2 CIR J346H

## (undated) DEVICE — Device

## (undated) DEVICE — HANDLE LT SNAP ON ULT DURABLE LENS FOR TRUMPF ALC DISPOSABLE

## (undated) DEVICE — 40418 TRENDELENBURG ONE-STEP ARM PROTECTORS LARGE (1 PAIR): Brand: 40418 TRENDELENBURG ONE-STEP ARM PROTECTORS LARGE (1 PAIR)

## (undated) DEVICE — PAD SANIT NPKN 4IN GRD

## (undated) DEVICE — DRAPE KIT ACCS 4-ARM DISP -- DA VINCI

## (undated) DEVICE — COVER,TABLE,60X90,STERILE: Brand: MEDLINE

## (undated) DEVICE — TRAY PREP DRY W/ PREM GLV 2 APPL 6 SPNG 2 UNDPD 1 OVERWRAP

## (undated) DEVICE — GOWN,SIRUS,NONRNF,SETINSLV,XL,20/CS: Brand: MEDLINE

## (undated) DEVICE — SYR 10ML LUER LOK 1/5ML GRAD --

## (undated) DEVICE — PREP PAD BNS: Brand: CONVERTORS

## (undated) DEVICE — BLADELESS OBTURATOR: Brand: WECK VISTA

## (undated) DEVICE — CLIP SUT ENDOSCP F/2-0/3-0/4-0 -- LAPRA-TY

## (undated) DEVICE — SUTURE MCRYL SZ 4-0 L27IN ABSRB UD L19MM PS-2 1/2 CIR PRIM Y426H

## (undated) DEVICE — VISUALIZATION SYSTEM: Brand: CLEARIFY

## (undated) DEVICE — SYR 50ML LR LCK 1ML GRAD NSAF --

## (undated) DEVICE — TIP COVER ACCESSORY

## (undated) DEVICE — NDL SPNE QNCKE 18GX3.5IN LF --

## (undated) DEVICE — TROCAR SITE CLOSURE DEVICE: Brand: ENDO CLOSE

## (undated) DEVICE — KENDALL SCD EXPRESS SLEEVES, KNEE LENGTH, MEDIUM: Brand: KENDALL SCD

## (undated) DEVICE — AIRSEAL 12 MM ACCESS PORT AND PALM GRIP OBTURATOR WITH BLADELESS OPTICAL TIP, 120 MM LENGTH: Brand: AIRSEAL

## (undated) DEVICE — REM POLYHESIVE ADULT PATIENT RETURN ELECTRODE: Brand: VALLEYLAB

## (undated) DEVICE — SYRINGE MED 20ML STD CLR PLAS LUERLOCK TIP N CTRL DISP

## (undated) DEVICE — NEEDLE HYPO 18GA L1.5IN PNK S STL HUB POLYPR SHLD REG BVL

## (undated) DEVICE — GLOVE SURG TRIFLX 9 PWD LTX --

## (undated) DEVICE — NEEDLE HYPO 21GA L1.5IN INTRAMUSCULAR S STL LATCH BVL UP

## (undated) DEVICE — (D)PREP SKN CHLRAPRP APPL 26ML -- CONVERT TO ITEM 371833

## (undated) DEVICE — DRAPE,ROBOTICS,STERILE: Brand: MEDLINE

## (undated) DEVICE — INSUFFLATION NEEDLE: Brand: SURGINEEDLE

## (undated) DEVICE — STERILE POLYISOPRENE POWDER-FREE SURGICAL GLOVES: Brand: PROTEXIS

## (undated) DEVICE — APPLICATOR SURG XL L38CM FOR ARISTA ABSRB HEMSTAT FLEXITIP

## (undated) DEVICE — INFECTION CONTROL KIT SYS

## (undated) DEVICE — SURGICAL PROCEDURE PACK GYN LAPAROSCOPY CUST SMH LF

## (undated) DEVICE — TOWEL SURG W17XL27IN STD BLU COT NONFENESTRATED PREWASHED

## (undated) DEVICE — TRAY CATH 16F DRN BG LTX -- CONVERT TO ITEM 363158

## (undated) DEVICE — ELECTRO LUBE IS A SINGLE PATIENT USE DEVICE THAT IS INTENDED TO BE USED ON ELECTROSURGICAL ELECTRODES TO REDUCE STICKING.: Brand: KEY SURGICAL ELECTRO LUBE

## (undated) DEVICE — DERMABOND SKIN ADH 0.7ML -- DERMABOND ADVANCED 12/BX